# Patient Record
Sex: FEMALE | Race: BLACK OR AFRICAN AMERICAN | NOT HISPANIC OR LATINO | URBAN - METROPOLITAN AREA
[De-identification: names, ages, dates, MRNs, and addresses within clinical notes are randomized per-mention and may not be internally consistent; named-entity substitution may affect disease eponyms.]

---

## 2021-06-04 ENCOUNTER — TELEPHONE ENCOUNTER (OUTPATIENT)
Dept: URBAN - METROPOLITAN AREA CLINIC 96 | Facility: CLINIC | Age: 56
End: 2021-06-04

## 2021-06-07 ENCOUNTER — OFFICE VISIT (OUTPATIENT)
Dept: URBAN - METROPOLITAN AREA CLINIC 92 | Facility: CLINIC | Age: 56
End: 2021-06-07
Payer: SELF-PAY

## 2021-06-07 DIAGNOSIS — K51.018 ULCERATIVE (CHRONIC) PANCOLITIS WITH OTHER COMPLICATION: ICD-10-CM

## 2021-06-07 PROBLEM — 442159003: Status: ACTIVE | Noted: 2021-06-07

## 2021-06-07 PROCEDURE — 99442 PHONE E/M BY PHYS 11-20 MIN: CPT | Performed by: INTERNAL MEDICINE

## 2021-06-07 RX ORDER — MESALAMINE 4 G/60ML
INSERT 60 MILLILITERS (4 GRAM) BY RECTAL ROUTE ONCE DAILY AT BEDTIME FOR 3 WEEKS ENEMA RECTAL 1
Qty: 1 | Refills: 0 | Status: ACTIVE | COMMUNITY
Start: 2018-01-11 | End: 1900-01-01

## 2021-06-07 RX ORDER — MESALAMINE 1.2 G/1
TAKE 4 TABLETS TABLET, DELAYED RELEASE ORAL ONCE A DAY
Qty: 120 | Refills: 11 | OUTPATIENT
Start: 2018-02-12 | End: 2022-06-02

## 2021-06-07 RX ORDER — MESALAMINE 4 G/60ML
60 ML AT BEDTIME ENEMA RECTAL ONCE A DAY
Qty: 1260 ML | Refills: 2 | OUTPATIENT

## 2021-06-07 RX ORDER — MESALAMINE 1.2 G/1
TAKE 2 TABLETS (2.4 GRAM) BY ORAL ROUTE ONCE DAILY WITH A MEAL FOR 30 DAYS TABLET, DELAYED RELEASE ORAL 1
Qty: 60 | Refills: 11 | Status: ACTIVE | COMMUNITY
Start: 2018-02-12 | End: 1900-01-01

## 2021-06-07 NOTE — HPI-TODAY'S VISIT:
This is a 55-year-old female presents for a sick visit.  She has not been seen in close to 3 years.  She has not taken medication in almost 2 years.  She notes 3 to 4 weeks of increasing diarrhea and blood in stool.  She is having over 10 bowel movements per day there is no fever.  She has had a decrease in appetite.  There are no sick contacts.  No recent antibiotic use.

## 2021-06-18 ENCOUNTER — OFFICE VISIT (OUTPATIENT)
Dept: URBAN - METROPOLITAN AREA CLINIC 92 | Facility: CLINIC | Age: 56
End: 2021-06-18

## 2021-07-14 ENCOUNTER — OFFICE VISIT (OUTPATIENT)
Dept: URBAN - METROPOLITAN AREA CLINIC 92 | Facility: CLINIC | Age: 56
End: 2021-07-14

## 2021-07-15 ENCOUNTER — LAB OUTSIDE AN ENCOUNTER (OUTPATIENT)
Dept: URBAN - METROPOLITAN AREA CLINIC 92 | Facility: CLINIC | Age: 56
End: 2021-07-15

## 2021-07-18 LAB
ADENOVIRUS F 40/41: NOT DETECTED
ASTROVIRUS: NOT DETECTED
C DIFFICILE TOXIN A/B: NOT DETECTED
CAMPYLOBACTER: NOT DETECTED
CRYPTOSPORIDIUM: NOT DETECTED
CYCLOSPORA CAYETANENSIS: NOT DETECTED
E COLI O157: NOT DETECTED
ENTAMOEBA HISTOLYTICA: NOT DETECTED
ENTEROAGGREGATIVE E COLI: NOT DETECTED
ENTEROPATHOGENIC E COLI: NOT DETECTED
ENTEROTOXIGENIC E COLI: NOT DETECTED
GIARDIA LAMBLIA: NOT DETECTED
NOROVIRUS GI/GII: NOT DETECTED
PLESIOMONAS SHIGELLOIDES: NOT DETECTED
ROTAVIRUS A: NOT DETECTED
SALMONELLA: NOT DETECTED
SAPOVIRUS: NOT DETECTED
SHIGA-TOXIN-PRODUCING E COLI: NOT DETECTED
SHIGELLA/ENTEROINVASIVE E COLI: NOT DETECTED
VIBRIO CHOLERAE: NOT DETECTED
VIBRIO: NOT DETECTED
YERSINIA ENTEROCOLITICA: NOT DETECTED

## 2021-07-30 ENCOUNTER — DASHBOARD ENCOUNTERS (OUTPATIENT)
Age: 56
End: 2021-07-30

## 2021-07-30 ENCOUNTER — OFFICE VISIT (OUTPATIENT)
Dept: URBAN - METROPOLITAN AREA CLINIC 92 | Facility: CLINIC | Age: 56
End: 2021-07-30
Payer: SELF-PAY

## 2021-07-30 VITALS
TEMPERATURE: 98 F | HEART RATE: 107 BPM | DIASTOLIC BLOOD PRESSURE: 64 MMHG | WEIGHT: 158 LBS | SYSTOLIC BLOOD PRESSURE: 128 MMHG | BODY MASS INDEX: 29.83 KG/M2 | HEIGHT: 61 IN

## 2021-07-30 DIAGNOSIS — K51.00 ULCERATIVE PANCOLITIS WITHOUT COMPLICATION: ICD-10-CM

## 2021-07-30 PROCEDURE — 99214 OFFICE O/P EST MOD 30 MIN: CPT | Performed by: INTERNAL MEDICINE

## 2021-07-30 RX ORDER — MESALAMINE 4 G/60ML
60 ML AT BEDTIME ENEMA RECTAL ONCE A DAY
Qty: 1260 ML | Refills: 2 | Status: ACTIVE | COMMUNITY

## 2021-07-30 RX ORDER — MESALAMINE 1.2 G/1
TAKE 4 TABLETS TABLET, DELAYED RELEASE ORAL ONCE A DAY
Qty: 120 | Refills: 11 | Status: ACTIVE | COMMUNITY
Start: 2018-02-12 | End: 2022-06-02

## 2021-07-30 NOTE — HPI-TODAY'S VISIT:
This is a 56-year-old female who now presents for follow-up.  Patient was diagnosed with ulcerative colitis, pancolitis on 01/11/2018.  She was treated with 5-ASA but developed nephrolithiasis and discontinued the medication.  Since, her symptoms have resolved spontaneously.  Patient now present with recurrent symptoms of abdominal pain associated with bloody diarrhea.  She was seen by Dr. Jt Hilton and restarted on mesalamine 1.2 g 4 tablets daily (4.8 g/day).  Stool studies excluded infectious etiology.  Patient states that she developed worsening right-sided abdominal pain and discontinued medication.  She has been taking herbal medication with resolution of her abdominal pain and bloody diarrhea.  There is no fever, chills, or constitutional symptoms including unintentional weight loss.

## 2021-10-12 PROBLEM — 444548001: Status: ACTIVE | Noted: 2021-07-30

## 2021-10-19 ENCOUNTER — OFFICE VISIT (OUTPATIENT)
Dept: URBAN - METROPOLITAN AREA SURGERY CENTER 16 | Facility: SURGERY CENTER | Age: 56
End: 2021-10-19

## 2021-10-29 ENCOUNTER — OFFICE VISIT (OUTPATIENT)
Dept: URBAN - METROPOLITAN AREA CLINIC 92 | Facility: CLINIC | Age: 56
End: 2021-10-29